# Patient Record
Sex: FEMALE | URBAN - METROPOLITAN AREA
[De-identification: names, ages, dates, MRNs, and addresses within clinical notes are randomized per-mention and may not be internally consistent; named-entity substitution may affect disease eponyms.]

---

## 2017-03-22 ASSESSMENT — ENCOUNTER SYMPTOMS
SPEECH CHANGE: 0
ALTERED TEMPERATURE REGULATION: 1
DECREASED APPETITE: 1
DEPRESSION: 1
HOARSE VOICE: 0
HEARTBURN: 1
JAUNDICE: 0
POOR WOUND HEALING: 1
WEAKNESS: 0
ARTHRALGIAS: 1
SEIZURES: 0
PARALYSIS: 0
EYE IRRITATION: 1
SMELL DISTURBANCE: 0
POLYPHAGIA: 0
ORTHOPNEA: 0
FEVER: 1
BLOOD IN STOOL: 0
HALLUCINATIONS: 0
NAIL CHANGES: 0
SINUS PAIN: 1
POLYDIPSIA: 1
JOINT SWELLING: 1
EXERCISE INTOLERANCE: 0
MYALGIAS: 1
CHILLS: 1
CLAUDICATION: 0
HYPERTENSION: 0
HOT FLASHES: 0
HYPOTENSION: 0
DOUBLE VISION: 0
DIFFICULTY URINATING: 0
SINUS CONGESTION: 1
NUMBNESS: 1
INCREASED ENERGY: 0
PANIC: 0
STIFFNESS: 1
LEG PAIN: 0
MUSCLE WEAKNESS: 1
NERVOUS/ANXIOUS: 1
SLEEP DISTURBANCES DUE TO BREATHING: 0
NECK PAIN: 1
WEIGHT LOSS: 1
BLOATING: 1
WEIGHT GAIN: 0
MUSCLE CRAMPS: 1
FATIGUE: 1
LEG SWELLING: 0
DECREASED CONCENTRATION: 1
TINGLING: 1
FLANK PAIN: 0
DISTURBANCES IN COORDINATION: 0
TASTE DISTURBANCE: 0
BRUISES/BLEEDS EASILY: 1
HEMATURIA: 0
RECTAL PAIN: 1
VOMITING: 0
INSOMNIA: 1
MEMORY LOSS: 1
DECREASED LIBIDO: 1
LOSS OF CONSCIOUSNESS: 0
ABDOMINAL PAIN: 1
TACHYCARDIA: 0
CONSTIPATION: 1
SORE THROAT: 0
LIGHT-HEADEDNESS: 1
PALPITATIONS: 0
EYE REDNESS: 1
DIZZINESS: 1
HEADACHES: 1
DYSURIA: 0
SYNCOPE: 0
SKIN CHANGES: 0
TREMORS: 0
DIARRHEA: 1
NAUSEA: 1
EYE WATERING: 0
TROUBLE SWALLOWING: 0
EYE PAIN: 0
NECK MASS: 0
BOWEL INCONTINENCE: 1
SWOLLEN GLANDS: 0
RECTAL BLEEDING: 0
BACK PAIN: 1

## 2017-03-23 ENCOUNTER — OFFICE VISIT (OUTPATIENT)
Dept: INFECTIOUS DISEASES | Facility: CLINIC | Age: 57
End: 2017-03-23
Payer: COMMERCIAL

## 2017-03-23 VITALS
HEART RATE: 67 BPM | SYSTOLIC BLOOD PRESSURE: 132 MMHG | BODY MASS INDEX: 33.2 KG/M2 | DIASTOLIC BLOOD PRESSURE: 81 MMHG | HEIGHT: 65 IN | TEMPERATURE: 98.5 F | WEIGHT: 199.3 LBS

## 2017-03-23 DIAGNOSIS — D80.9 IMMUNOGLOBULIN DEFICIENCY (H): Primary | ICD-10-CM

## 2017-03-23 DIAGNOSIS — D80.9 IMMUNOGLOBULIN DEFICIENCY (H): ICD-10-CM

## 2017-03-23 LAB
ALBUMIN SERPL-MCNC: 4 G/DL (ref 3.4–5)
ALP SERPL-CCNC: 81 U/L (ref 40–150)
ALT SERPL W P-5'-P-CCNC: 16 U/L (ref 0–50)
ANION GAP SERPL CALCULATED.3IONS-SCNC: 9 MMOL/L (ref 3–14)
AST SERPL W P-5'-P-CCNC: 17 U/L (ref 0–45)
BASOPHILS # BLD AUTO: 0 10E9/L (ref 0–0.2)
BASOPHILS NFR BLD AUTO: 0.5 %
BILIRUB SERPL-MCNC: 0.3 MG/DL (ref 0.2–1.3)
BUN SERPL-MCNC: 18 MG/DL (ref 7–30)
CALCIUM SERPL-MCNC: 8.8 MG/DL (ref 8.5–10.1)
CHLORIDE SERPL-SCNC: 107 MMOL/L (ref 94–109)
CO2 SERPL-SCNC: 26 MMOL/L (ref 20–32)
CREAT SERPL-MCNC: 1.02 MG/DL (ref 0.52–1.04)
DIFFERENTIAL METHOD BLD: ABNORMAL
EOSINOPHIL # BLD AUTO: 0.4 10E9/L (ref 0–0.7)
EOSINOPHIL NFR BLD AUTO: 6.2 %
ERYTHROCYTE [DISTWIDTH] IN BLOOD BY AUTOMATED COUNT: 17.8 % (ref 10–15)
GFR SERPL CREATININE-BSD FRML MDRD: 56 ML/MIN/1.7M2
GLUCOSE SERPL-MCNC: 84 MG/DL (ref 70–99)
HCT VFR BLD AUTO: 31.2 % (ref 35–47)
HGB BLD-MCNC: 8.9 G/DL (ref 11.7–15.7)
IMM GRANULOCYTES # BLD: 0 10E9/L (ref 0–0.4)
IMM GRANULOCYTES NFR BLD: 0.3 %
LYMPHOCYTES # BLD AUTO: 1.6 10E9/L (ref 0.8–5.3)
LYMPHOCYTES NFR BLD AUTO: 23.9 %
MCH RBC QN AUTO: 19.4 PG (ref 26.5–33)
MCHC RBC AUTO-ENTMCNC: 28.5 G/DL (ref 31.5–36.5)
MCV RBC AUTO: 68 FL (ref 78–100)
MONOCYTES # BLD AUTO: 0.5 10E9/L (ref 0–1.3)
MONOCYTES NFR BLD AUTO: 7.4 %
NEUTROPHILS # BLD AUTO: 4.1 10E9/L (ref 1.6–8.3)
NEUTROPHILS NFR BLD AUTO: 61.7 %
NRBC # BLD AUTO: 0 10*3/UL
NRBC BLD AUTO-RTO: 0 /100
PLATELET # BLD AUTO: 338 10E9/L (ref 150–450)
POTASSIUM SERPL-SCNC: 4.1 MMOL/L (ref 3.4–5.3)
PROT SERPL-MCNC: 6.9 G/DL (ref 6.8–8.8)
RBC # BLD AUTO: 4.58 10E12/L (ref 3.8–5.2)
SODIUM SERPL-SCNC: 142 MMOL/L (ref 133–144)
WBC # BLD AUTO: 6.6 10E9/L (ref 4–11)

## 2017-03-23 PROCEDURE — 82784 ASSAY IGA/IGD/IGG/IGM EACH: CPT | Performed by: INTERNAL MEDICINE

## 2017-03-23 PROCEDURE — 36415 COLL VENOUS BLD VENIPUNCTURE: CPT | Performed by: INTERNAL MEDICINE

## 2017-03-23 PROCEDURE — 86648 DIPHTHERIA ANTIBODY: CPT | Performed by: INTERNAL MEDICINE

## 2017-03-23 PROCEDURE — 86355 B CELLS TOTAL COUNT: CPT | Performed by: INTERNAL MEDICINE

## 2017-03-23 PROCEDURE — 86357 NK CELLS TOTAL COUNT: CPT | Performed by: INTERNAL MEDICINE

## 2017-03-23 PROCEDURE — 82785 ASSAY OF IGE: CPT | Performed by: INTERNAL MEDICINE

## 2017-03-23 PROCEDURE — 86360 T CELL ABSOLUTE COUNT/RATIO: CPT | Performed by: INTERNAL MEDICINE

## 2017-03-23 PROCEDURE — 86359 T CELLS TOTAL COUNT: CPT | Performed by: INTERNAL MEDICINE

## 2017-03-23 PROCEDURE — 85025 COMPLETE CBC W/AUTO DIFF WBC: CPT | Performed by: INTERNAL MEDICINE

## 2017-03-23 PROCEDURE — 86774 TETANUS ANTIBODY: CPT | Performed by: INTERNAL MEDICINE

## 2017-03-23 PROCEDURE — 80053 COMPREHEN METABOLIC PANEL: CPT | Performed by: INTERNAL MEDICINE

## 2017-03-23 PROCEDURE — 99213 OFFICE O/P EST LOW 20 MIN: CPT | Mod: ZF

## 2017-03-23 RX ORDER — ALBUTEROL SULFATE 0.83 MG/ML
1 SOLUTION RESPIRATORY (INHALATION) EVERY 6 HOURS PRN
COMMUNITY

## 2017-03-23 RX ORDER — CYANOCOBALAMIN 1000 UG/ML
1 INJECTION, SOLUTION INTRAMUSCULAR; SUBCUTANEOUS
COMMUNITY

## 2017-03-23 RX ORDER — FLUTICASONE PROPIONATE 50 MCG
2 SPRAY, SUSPENSION (ML) NASAL DAILY
COMMUNITY
End: 2017-05-04

## 2017-03-23 RX ORDER — EPINEPHRINE 0.3 MG/.3ML
0.3 INJECTION SUBCUTANEOUS PRN
COMMUNITY

## 2017-03-23 ASSESSMENT — PAIN SCALES - GENERAL: PAINLEVEL: SEVERE PAIN (6)

## 2017-03-23 NOTE — NURSING NOTE
"Chief Complaint   Patient presents with     New Patient     Low antibodies       Initial /81  Pulse 67  Temp 98.5  F (36.9  C) (Oral)  Ht 1.638 m (5' 4.5\")  Wt 90.4 kg (199 lb 4.8 oz)  BMI 33.68 kg/m2 Estimated body mass index is 33.68 kg/(m^2) as calculated from the following:    Height as of this encounter: 1.638 m (5' 4.5\").    Weight as of this encounter: 90.4 kg (199 lb 4.8 oz).  Medication Reconciliation: complete  "

## 2017-03-23 NOTE — PATIENT INSTRUCTIONS
Thank you for coming in today to talk about your immune system.    We will draw labs today as follows:    Orders Placed This Encounter   Procedures     CBC with platelets differential     Comprehensive metabolic panel     T cell subset extended profile     Immunoglobulins A G and M     IgG     Diphtheria tetanus antibody panel     Please come back in about 2 weeks to discuss labs and next steps.    We will try to get records from [facility] before your next visit.    Please feel free to contact me as below if you have any concerns in the meantime.      Dilan Fulton MD  Fellow, Infectious Disease    Direct line to call or text during business hours Mon-Fri 8am-5pm:    [phone]    Email anytime:   [email or web address]

## 2017-03-23 NOTE — PROGRESS NOTES
"  INFECTIOUS DISEASE CLINIC    REASON FOR VISIT:   New consultation for immunoglobulin deficiency     REFERRED BY:  Dr. Heath Baum MD of [facility]     HISTORY OF PRESENT ILLNESS:   Nery Chandler is a 55 y/o female patient with obesity and prior Nadya-en-Y gastric bypass surgery.  She presents to our clinic today for further evaluation of her low immunoglobulin levels, which were detected by Gastroenterology during their evaluation for diarrhea.    Nery describes struggling with diarrhea since at least .  Per my review of her office notes from Gastroenterology, they think this is likely to be due to constipation predominant irritable bowel syndrome with episodes of overflow.  The patient has daily bowel movements but with a feeling of incomplete evacuation, and then about once every 3 weeks will have 8-10 episodes of \"explosive watery diarrhea.\"  She also has increased gas and abdominal spasms.  This has improved somewhat with the use of dicyclomine and over the counter anti-gas preparations.  They have recently started a bowel regimen with Miralax.     Colonoscopy in  noted sigmoid diverticulosis, but otherwise unremarkable.   As part of the work up, they wanted to test for celiac disease but found the total IgA was low at 23.  They then checked the IgE/IgG/IgM and found these to be low as well at 2/398/24 respectively.    GI:  Nery notes a history of two GI infections in late  and early . She was diagnosed with both Aeromonas infection as well as C. Difficile colitis.  She and her gastroenterologist think her IBS developed after these infections.   She recently had a bout of abdominal pain and elevated temperatures that was treated as diverticulitis with a course of Augmentin.    Skin and soft tissue:  Nery reports wound dehiscence with infections after each of her C-sections when she was in her 20s.  She required a wound vac after her second .  She recalls several " "procedures to repair her incision, including hernia repair and a \"tummy tuck.\"  She doesn't think she healed well after her gastric bypass surgery either.  She also reports recurrent skin and soft tissue infections in her groin, with at least 4 abscesses requiring I+D in the past.     Sinopulmonary:  Nery tells me that \"any kind of cold turns into a bronchitis.\"  She has needed many courses of antibiotics in the past for both \"bronchitis\" and sinus infections. She has never been hospitalized for a respiratory infection. She may also have allergic rhinitis and reports that Benadryl has helped with her sinuses in the past.   She is also a smoker, since age 15.  She smoked up to 2ppd of tobacco cigarettes in her 20s, and is now down to a 1/2 ppd in the last few years.     PAST MEDICAL HISTORY:    No past medical history on file.  Depression  Hypothyroidism  Migraine headaches  Obesity  Heart murmur NOS    PAST SURGICAL HISTORY:  No past surgical history on file.  Nadya-en-Y gastric bypass 2004  C-sections x 2  Hysterectomy  Abdominal wall hernia repairs  Cholecystectomy    FAMILY PAST MEDICAL HISTORY:  No known family history of immunodeficiency     SOCIAL HISTORY:  As above, has smoked tobacco cigarettes since age 15  Presents in clinic with her , who is very supportive.  They live together and have adult children.    CURRENT MEDICATIONS:    Current Outpatient Prescriptions   Medication     LEVOTHYROXINE SODIUM PO     LISINOPRIL PO     QUEtiapine Fumarate (SEROQUEL PO)     BuPROPion HCl (WELLBUTRIN XL PO)     ONDANSETRON PO     TRAZODONE HCL PO     DICYCLOMINE HCL PO     SERTRALINE HCL PO     PANTOPRAZOLE SODIUM PO     IBUPROFEN PO     fluticasone (FLONASE) 50 MCG/ACT spray     aspirin-acetaminophen-caffeine (EXCEDRIN MIGRAINE) 250-250-65 MG per tablet     DIPHENHYDRAMINE HCL PO     cyanocobalamin (VITAMIN B12) 1000 MCG/ML injection     SUMATRIPTAN SUCCINATE PO     albuterol (2.5 MG/3ML) 0.083% neb solution     " "EPINEPHrine 0.3 MG/0.3ML injection     UNABLE TO FIND     No current facility-administered medications for this visit.      ALLERGIES:     Reports a history of anaphylaxis that required emergent intervention.  No etiology was identified, but patient now carries an epi-pen.     Allergies   Allergen Reactions     Chlorpheniramine Unknown     Dexatrim Unknown     Phenylpropanolamine Unknown     Celexa [Citalopram] Palpitations     Erythromycin Palpitations     Hydrocodone Palpitations     REVIEW OF SYSTEMS: A full 12-point review of systems was obtained, pertinent positives and negatives as above.   Patient also filled out an extensive clinic survey of HPI and ROS that is available in EPIC.  We reviewed these responses.  Important to note:  Patient sometimes has spontaneous cold chills without fevers.   (She did have some elevated temps with the recent diagnosis of diverticulitis, but the cold chills continued after temperature normalized)  She also reports some increased urinary urgency without dysuria or polyuria for the last 6 months.  She seems to be prone to vaginal yeast infections and has noted a lower labido with vaginal dryness.  She sees a chiropractor for various muscle aches.     PHYSICAL EXAMINATION:    VITAL SIGNS: /81  Pulse 67  Temp 98.5  F (36.9  C) (Oral)  Ht 1.638 m (5' 4.5\")  Wt 90.4 kg (199 lb 4.8 oz)  BMI 33.68 kg/m2    GENERAL:   No acute distress    HEENT: No icterus or injection. Oropharynx moist and clear without lesions or exudate.    NECK: Supple and nontender  LYMPH:  No cervical, axillary or inguinal lymphadenopathy  LUNGS: Clear to ausculation bilaterally without any increased work of breathing  HEART: Regular rate and rhythm and no murmur, gallop or rub    ABDOMEN: Normoactive bowel sounds, obese and soft, nontender, nondistended, no hepatosplenomegaly.    /GYN: deferred  EXTREMITIES: Warm and well perfused without clubbing, cyanosis, or edema  SKIN:  No rashes  NEURO:  " Awake, alert, no focal neurologic deficits.  PSYCH: Affect normal. Speech fluent and appropriate.     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  Her levels obtained from [facility] are quite low across the board.  First, we would like to see her albumin to make sure that this is not attributable to a protein losing enteropathy.  Otherwise, she does have a history of some GI, skin/soft tissue, and sinopulmonary infections.  Some of this may be explained by her obesity and smoking history, however it is significant enough to warrant further investigation.  There is potential for common variable immunodeficiency (CVID).    Today we will send for a CBC, CMP, and will repeat the immunoglobulins to see if there are has been an interval change.  We will also assess B and T cell subsets, along with some vaccine response with a Diphtheria/Tetanus antibody titer.   The patient does not recall when her last tetanus shot was, and there is no record in [facility].    We will have the patient return in 2 weeks to discuss results and next steps.      Patient was seen and discussed with the Infectious Disease attending and clinic preceptor, Dr. Azeb Brown.      Dilan Fulton MD    Fellow in Adult and Pediatric Infectious Disease    pager: [phone]    Attending Attestation:  I evaluated Ms Chandler with fellow Dr. Fulton.  I have reviewed today's labs, vitals and imaging results. This note reflects our joint findings, assessment and recommendations.    Azeb Brown MD  [phone]

## 2017-03-23 NOTE — MR AVS SNAPSHOT
After Visit Summary   3/23/2017    Nery Chandler    MRN: [id number]           Patient Information     Date Of Birth          1960        Visit Information        Provider Department      3/23/2017 2:30 PM Dilan Fulton MD [facility] and Infectious Diseases        Today's Diagnoses     Immunoglobulin deficiency (H)    -  1      Care Instructions        Thank you for coming in today to talk about your immune system.    We will draw labs today as follows:    Orders Placed This Encounter   Procedures     CBC with platelets differential     Comprehensive metabolic panel     T cell subset extended profile     Immunoglobulins A G and M     IgG     Diphtheria tetanus antibody panel     Please come back in about 2 weeks to discuss labs and next steps.    We will try to get records from [facility] before your next visit.    Please feel free to contact me as below if you have any concerns in the meantime.      Dilan Fulton MD  Fellow, Infectious Disease    Direct line to call or text during business hours Mon-Fri 8am-5pm:    [phone]    Email anytime:   [email or web address]        Follow-ups after your visit        Your next 10 appointments already scheduled     Mar 23, 2017  4:15 PM CDT   Lab with  LAB   [facility] ([facility])    [address] [place] [address]  [address] [facility] [id number] [phone]            Apr 06, 2017  3:30 PM CDT   (Arrive by 3:15 PM)   Return Visit with Dilan Fulton MD   [facility] and Infectious Diseases ([facility])    [address] [place] [address]  3rd Floor  [facility] [id number] [phone]              Future tests that were ordered for you today     Open Future Orders        Priority Expected Expires Ordered    CBC with platelets differential Routine 3/23/2017 4/23/2017 3/23/2017    Comprehensive metabolic panel Routine 3/23/2017 4/23/2017 3/23/2017    T cell  "subset extended profile Routine 3/23/2017 4/23/2017 3/23/2017    Immunoglobulins A G and M Routine 3/23/2017 4/23/2017 3/23/2017    IgG Routine 3/23/2017 4/23/2017 3/23/2017    Diphtheria tetanus antibody panel Routine 3/23/2017 4/23/2017 3/23/2017            Who to contact     If you have questions or need follow up information about today's clinic visit or your schedule please contact [facility] INFECTIOUS DISEASES directly at [phone].  Normal or non-critical lab and imaging results will be communicated to you by [employer], letter or phone within 4 business days after the clinic has received the results. If you do not hear from us within 7 days, please contact the clinic through [employer] or phone. If you have a critical or abnormal lab result, we will notify you by phone as soon as possible.  Submit refill requests through [employer] or call your pharmacy and they will forward the refill request to us. Please allow 3 business days for your refill to be completed.          Additional Information About Your Visit        [employer] Information     [employer] gives you secure access to your electronic health record. If you see a primary care provider, you can also send messages to your care team and make appointments. If you have questions, please call your primary care clinic.  If you do not have a primary care provider, please call [phone] and they will assist you.        Care EveryWhere ID     This is your Care EveryWhere ID. This could be used by other organizations to access your [facility] medical records  [id number]        Your Vitals Were     Pulse Temperature Height BMI (Body Mass Index)          67 98.5  F (36.9  C) (Oral) 1.638 m (5' 4.5\") 33.68 kg/m2         Blood Pressure from Last 3 Encounters:   03/23/17 132/81    Weight from Last 3 Encounters:   03/23/17 90.4 kg (199 lb 4.8 oz)               Primary Care Provider Office Phone # Fax #    Jose ALVINA Astorga [phone]       " [facility] [address] [facility] [address]        Thank you!     Thank you for choosing [facility] INFECTIOUS DISEASES  for your care. Our goal is always to provide you with excellent care. Hearing back from our patients is one way we can continue to improve our services. Please take a few minutes to complete the written survey that you may receive in the mail after your visit with us. Thank you!             Your Updated Medication List - Protect others around you: Learn how to safely use, store and throw away your medicines at www.disposemymeds.org.          This list is accurate as of: 3/23/17  4:12 PM.  Always use your most recent med list.                   Brand Name Dispense Instructions for use    albuterol (2.5 MG/3ML) 0.083% neb solution      Take 1 vial by nebulization every 6 hours as needed for shortness of breath / dyspnea or wheezing       aspirin-acetaminophen-caffeine 250-250-65 MG per tablet    EXCEDRIN MIGRAINE     Take 2 tablets by mouth every 6 hours as needed for headaches       cyanocobalamin 1000 MCG/ML injection    VITAMIN B12     Inject 1 mL into the muscle every 30 days       DICYCLOMINE HCL PO      Take 20 mg by mouth 4 times daily       DIPHENHYDRAMINE HCL PO      Take 25 mg by mouth every 4 hours as needed       EPINEPHrine 0.3 MG/0.3ML injection      Inject 0.3 mg into the muscle as needed for anaphylaxis       fluticasone 50 MCG/ACT spray    FLONASE     Spray 2 sprays into both nostrils daily       IBUPROFEN PO      Take 200 mg by mouth 4 times daily       LEVOTHYROXINE SODIUM PO      Take 112 mcg by mouth daily       LISINOPRIL PO      Take 10 mg by mouth daily       ONDANSETRON PO      Take 4 mg by mouth every 8 hours as needed for nausea       PANTOPRAZOLE SODIUM PO      Take 40 mg by mouth daily       SEROQUEL PO      Take 50 mg by mouth At Bedtime       SERTRALINE HCL PO      Take 100 mg by mouth 2 times daily       SUMATRIPTAN SUCCINATE PO       Take 100 mg by mouth once       TIZANIDINE HCL PO      Take 2 mg by mouth 2 times daily       TRAZODONE HCL PO      Take 100 mg by mouth At Bedtime       UNABLE TO FIND      Take 2 tablets by mouth daily MEDICATION NAME: IBgard for IBS       WELLBUTRIN XL PO      Take 300 mg by mouth daily

## 2017-03-24 LAB
CD19 CELLS # BLD: 264 CELLS/UL (ref 107–698)
CD19 CELLS NFR BLD: 18 % (ref 6–27)
CD3 CELLS # BLD: 1040 CELLS/UL (ref 603–2990)
CD3 CELLS NFR BLD: 69 % (ref 49–84)
CD3+CD4+ CELLS # BLD: 709 CELLS/UL (ref 441–2156)
CD3+CD4+ CELLS NFR BLD: 47 % (ref 28–63)
CD3+CD4+ CELLS/CD3+CD8+ CLL BLD: 2.47 % (ref 1.4–2.6)
CD3+CD8+ CELLS # BLD: 282 CELLS/UL (ref 125–1312)
CD3+CD8+ CELLS NFR BLD: 19 % (ref 10–40)
CD3-CD16+CD56+ CELLS # BLD: 173 CELLS/UL (ref 95–640)
CD3-CD16+CD56+ CELLS NFR BLD: 12 % (ref 4–25)
IFC SPECIMEN: NORMAL
IGA SERPL-MCNC: 32 MG/DL (ref 70–380)
IGG SERPL-MCNC: 441 MG/DL (ref 695–1620)
IGM SERPL-MCNC: 34 MG/DL (ref 60–265)
IMMUNODEFICIENCY MARKERS SPEC-IMP: NORMAL

## 2017-03-26 LAB — IGE SERPL-ACNC: 4 KIU/L (ref 0–114)

## 2017-03-29 LAB
C DIPHTHERIAE IGG SER IA-ACNC: 0.41 IU/ML
C TETANI IGG SER IA-ACNC: 4.52 IU/ML

## 2017-04-02 ASSESSMENT — ENCOUNTER SYMPTOMS
NAUSEA: 1
HEARTBURN: 0
SEIZURES: 0
ALTERED TEMPERATURE REGULATION: 1
TINGLING: 1
HOT FLASHES: 0
MEMORY LOSS: 0
DECREASED CONCENTRATION: 1
MUSCLE WEAKNESS: 1
WEAKNESS: 1
SINUS PAIN: 1
POOR WOUND HEALING: 0
TACHYCARDIA: 0
JOINT SWELLING: 1
LIGHT-HEADEDNESS: 0
BLOATING: 1
BACK PAIN: 1
STIFFNESS: 1
SINUS CONGESTION: 1
RECTAL BLEEDING: 0
DIFFICULTY URINATING: 0
HYPERTENSION: 0
SLEEP DISTURBANCES DUE TO BREATHING: 0
DEPRESSION: 1
SMELL DISTURBANCE: 0
FATIGUE: 1
INSOMNIA: 1
MYALGIAS: 1
CHILLS: 1
PANIC: 0
TASTE DISTURBANCE: 0
DISTURBANCES IN COORDINATION: 0
INCREASED ENERGY: 1
PALPITATIONS: 0
POLYPHAGIA: 0
LEG PAIN: 0
TREMORS: 0
CLAUDICATION: 0
FEVER: 0
BLOOD IN STOOL: 0
DECREASED APPETITE: 1
ORTHOPNEA: 0
DIZZINESS: 1
NUMBNESS: 1
WEIGHT GAIN: 0
NERVOUS/ANXIOUS: 1
LOSS OF CONSCIOUSNESS: 0
DIARRHEA: 1
EXERCISE INTOLERANCE: 0
VOMITING: 0
NECK PAIN: 1
SORE THROAT: 0
NIGHT SWEATS: 0
HALLUCINATIONS: 0
HEMATURIA: 0
HYPOTENSION: 0
BRUISES/BLEEDS EASILY: 0
SWOLLEN GLANDS: 1
CONSTIPATION: 1
SYNCOPE: 0
POLYDIPSIA: 1
ARTHRALGIAS: 1
ABDOMINAL PAIN: 1
HOARSE VOICE: 0
NECK MASS: 0
HEADACHES: 1
LEG SWELLING: 0
PARALYSIS: 0
DECREASED LIBIDO: 1
DYSURIA: 0
FLANK PAIN: 0
NAIL CHANGES: 0
JAUNDICE: 0
MUSCLE CRAMPS: 1
BOWEL INCONTINENCE: 0
TROUBLE SWALLOWING: 0
SKIN CHANGES: 0
SPEECH CHANGE: 0
WEIGHT LOSS: 1
RECTAL PAIN: 0

## 2017-04-06 ENCOUNTER — OFFICE VISIT (OUTPATIENT)
Dept: INFECTIOUS DISEASES | Facility: CLINIC | Age: 57
End: 2017-04-06
Attending: COLON & RECTAL SURGERY
Payer: COMMERCIAL

## 2017-04-06 VITALS
SYSTOLIC BLOOD PRESSURE: 116 MMHG | BODY MASS INDEX: 33.2 KG/M2 | DIASTOLIC BLOOD PRESSURE: 73 MMHG | TEMPERATURE: 97.9 F | HEART RATE: 77 BPM | WEIGHT: 199.3 LBS | HEIGHT: 65 IN

## 2017-04-06 DIAGNOSIS — D80.9 IMMUNOGLOBULIN DEFICIENCY (H): ICD-10-CM

## 2017-04-06 DIAGNOSIS — D80.9 IMMUNOGLOBULIN DEFICIENCY (H): Primary | ICD-10-CM

## 2017-04-06 PROCEDURE — 99213 OFFICE O/P EST LOW 20 MIN: CPT | Mod: ZF

## 2017-04-06 PROCEDURE — 86317 IMMUNOASSAY INFECTIOUS AGENT: CPT | Mod: 91 | Performed by: INTERNAL MEDICINE

## 2017-04-06 PROCEDURE — 36415 COLL VENOUS BLD VENIPUNCTURE: CPT | Performed by: INTERNAL MEDICINE

## 2017-04-06 RX ORDER — METHYLPREDNISOLONE 4 MG
TABLET, DOSE PACK ORAL
COMMUNITY
Start: 2017-04-05 | End: 2017-05-04

## 2017-04-06 ASSESSMENT — PAIN SCALES - GENERAL: PAINLEVEL: NO PAIN (0)

## 2017-04-06 NOTE — NURSING NOTE
"Chief Complaint   Patient presents with     RECHECK     follow up with immunoglobulins, tzimmer cma       Initial /73  Pulse 77  Temp 97.9  F (36.6  C) (Oral)  Ht 1.638 m (5' 4.5\")  Wt 90.4 kg (199 lb 4.8 oz)  BMI 33.68 kg/m2 Estimated body mass index is 33.68 kg/(m^2) as calculated from the following:    Height as of this encounter: 1.638 m (5' 4.5\").    Weight as of this encounter: 90.4 kg (199 lb 4.8 oz).  Medication Reconciliation: complete    "

## 2017-04-06 NOTE — PROGRESS NOTES
"    INFECTIOUS DISEASE CLINIC    REASON FOR VISIT:   Follow-up immunoglobulin deficiency     HISTORY OF PRESENT ILLNESS:   Nery Chandler is a 57 y/o female patient with obesity, depression, prior Nadya-en-Y gastric bypass surgery and other abdominal surgeries, and a diagnosis per GI of constipation predominant irritable bowel syndrome with episodes of overflow. She presents to our clinic today for follow-up of low immunoglobulin levels that were identified by GI during their work-up.  Her history of prior infections revealed some GI, skin/soft tissue, and sinopulmonary infections.    For our initial visit 3/23/17, we ordered CBC, CMP, repeat immunoglobulins, B and T cell subsets, and a Diphtheria/Tetanus antibody titer.  This revealed anemia, normal CMP including normal albumin of 4.0, low immunoglobulins consistent with prior assessments, normal B and T cell subsets, and protective Diphtheria/Tetanus antibody titers.    Nery reports improvements in her GI symptoms with her new bowel regimen designed to relieve constipation.  She has her next GI appointment 5/9/17.    Moreover, eNry has been suffering from pain related to a \"pinched nerve\" and she reports starting a Medrol dose pack to treat these symptoms.    No new infections and no fevers or chills noted since our last visit.         PAST MEDICAL HISTORY:    Depression  Hypothyroidism  Migraine headaches  Obesity  Heart murmur NOS    PAST SURGICAL HISTORY:  Nadya-en-Y gastric bypass 2004  C-sections x 2  Hysterectomy  Abdominal wall hernia repairs  Cholecystectomy    FAMILY PAST MEDICAL HISTORY:  No known family history of immunodeficiency     SOCIAL HISTORY:  Has smoked tobacco cigarettes since age 15  Presents in clinic with her , who is very supportive.  They live together and have adult children.    CURRENT MEDICATIONS:    Current Outpatient Prescriptions   Medication     LEVOTHYROXINE SODIUM PO     LISINOPRIL PO     QUEtiapine Fumarate (SEROQUEL PO) " "    BuPROPion HCl (WELLBUTRIN XL PO)     ONDANSETRON PO     TRAZODONE HCL PO     DICYCLOMINE HCL PO     SERTRALINE HCL PO     PANTOPRAZOLE SODIUM PO     IBUPROFEN PO     fluticasone (FLONASE) 50 MCG/ACT spray     aspirin-acetaminophen-caffeine (EXCEDRIN MIGRAINE) 250-250-65 MG per tablet     DIPHENHYDRAMINE HCL PO     cyanocobalamin (VITAMIN B12) 1000 MCG/ML injection     SUMATRIPTAN SUCCINATE PO     albuterol (2.5 MG/3ML) 0.083% neb solution     EPINEPHrine 0.3 MG/0.3ML injection     UNABLE TO FIND     No current facility-administered medications for this visit.      ALLERGIES:     Reports a history of anaphylaxis that required emergent intervention.  No etiology was identified, but patient now carries an epi-pen.     Allergies   Allergen Reactions     Chlorpheniramine Unknown     Dexatrim Unknown     Phenylpropanolamine Unknown     Celexa [Citalopram] Palpitations     Erythromycin Palpitations     Hydrocodone Palpitations     REVIEW OF SYSTEMS: A full 12-point review of systems was obtained, pertinent positives and negatives as above.   Patient also filled out an extensive clinic survey of HPI and ROS that is available in EPIC.  We reviewed these responses, and did not find any new concerns.    PHYSICAL EXAMINATION:    VITAL SIGNS: /73  Pulse 77  Temp 97.9  F (36.6  C) (Oral)  Ht 1.638 m (5' 4.5\")  Wt 90.4 kg (199 lb 4.8 oz)  BMI 33.68 kg/m2    GENERAL:   No acute distress    HEENT: No icterus or injection. Oropharynx moist and clear without lesions or exudate.    NECK: Supple but tender and with muscular spasm noted  LYMPH:  No cervical, axillary or inguinal lymphadenopathy  LUNGS: Clear to ausculation bilaterally without any increased work of breathing  HEART: Regular rate and rhythm and no murmur, gallop or rub    ABDOMEN: Normoactive bowel sounds, obese and soft, nontender, nondistended, no hepatosplenomegaly.    /GYN: deferred  EXTREMITIES: Warm and well perfused without clubbing, cyanosis, or " edema  SKIN:  No rashes  NEURO:  Awake, alert, no focal neurologic deficits.  PSYCH: Affect normal. Speech fluent and appropriate.     LABORATORY DATA:    Reviewed labs from [facility]:  1/9/17:   Fecal fat - normal,   Fecal pancreatic elastase >500, Cryptosporidium Ag and Giardia Ag negative, serum IgA 23, tissue Transglutaminase IgA <2    2/8/17:   IgE 2,  IgA 24,  IgG 398,  IgM 24    Labs here at [facility]:  3/23/2017  Orders Only on 03/23/2017   Component Date Value Ref Range Status     WBC 03/23/2017 6.6  4.0 - 11.0 10e9/L Final     RBC Count 03/23/2017 4.58  3.8 - 5.2 10e12/L Final     Hemoglobin 03/23/2017 8.9* 11.7 - 15.7 g/dL Final     Hematocrit 03/23/2017 31.2* 35.0 - 47.0 % Final     MCV 03/23/2017 68* 78 - 100 fl Final     MCH 03/23/2017 19.4* 26.5 - 33.0 pg Final     MCHC 03/23/2017 28.5* 31.5 - 36.5 g/dL Final     RDW 03/23/2017 17.8* 10.0 - 15.0 % Final     Platelet Count 03/23/2017 338  150 - 450 10e9/L Final     Diff Method 03/23/2017 Auto  Final     % Neutrophils 03/23/2017 61.7  % Final     % Lymphocytes 03/23/2017 23.9  % Final     % Monocytes 03/23/2017 7.4  % Final     % Eosinophils 03/23/2017 6.2  % Final     % Basophils 03/23/2017 0.5  % Final     % Immature Granulocytes 03/23/2017 0.3  % Final     Nucleated RBCs 03/23/2017 0  0 /100 Final     Absolute Neutrophil 03/23/2017 4.1  1.6 - 8.3 10e9/L Final     Absolute Lymphocytes 03/23/2017 1.6  0.8 - 5.3 10e9/L Final     Absolute Monocytes 03/23/2017 0.5  0.0 - 1.3 10e9/L Final     Absolute Eosinophils 03/23/2017 0.4  0.0 - 0.7 10e9/L Final     Absolute Basophils 03/23/2017 0.0  0.0 - 0.2 10e9/L Final     Abs Immature Granulocytes 03/23/2017 0.0  0 - 0.4 10e9/L Final     Absolute Nucleated RBC 03/23/2017 0.0   Final     Sodium 03/23/2017 142  133 - 144 mmol/L Final     Potassium 03/23/2017 4.1  3.4 - 5.3 mmol/L Final     Chloride 03/23/2017 107  94 - 109 mmol/L Final     Carbon Dioxide 03/23/2017 26  20 - 32 mmol/L Final      Anion Gap 03/23/2017 9  3 - 14 mmol/L Final     Glucose 03/23/2017 84  70 - 99 mg/dL Final     Urea Nitrogen 03/23/2017 18  7 - 30 mg/dL Final     Creatinine 03/23/2017 1.02  0.52 - 1.04 mg/dL Final     GFR Estimate 03/23/2017 56* >60 mL/min/1.7m2 Final    Non  GFR Calc     GFR Estimate If Black 03/23/2017 68  >60 mL/min/1.7m2 Final    African American GFR Calc     Calcium 03/23/2017 8.8  8.5 - 10.1 mg/dL Final     Bilirubin Total 03/23/2017 0.3  0.2 - 1.3 mg/dL Final     Albumin 03/23/2017 4.0  3.4 - 5.0 g/dL Final     Protein Total 03/23/2017 6.9  6.8 - 8.8 g/dL Final     Alkaline Phosphatase 03/23/2017 81  40 - 150 U/L Final     ALT 03/23/2017 16  0 - 50 U/L Final     AST 03/23/2017 17  0 - 45 U/L Final     IFC Specimen 03/23/2017 Blood   Final     CD3 Mature T 03/23/2017 69  49 - 84 % Final     CD4 [place] T 03/23/2017 47  28 - 63 % Final     CD8 Suppressor T 03/23/2017 19  10 - 40 % Final     CD19 B Cells 03/23/2017 18  6 - 27 % Final     CD16 + 56 Natural Killer Cells 03/23/2017 12  4 - 25 % Final     CD4:CD8 Ratio 03/23/2017 2.47  1.40 - 2.60 Final     Absolute CD3 03/23/2017 1040  603 - 2990 cells/uL Final     Absolute CD4 03/23/2017 709  441 - 2156 cells/uL Final     Absolute CD8 03/23/2017 282  125 - 1312 cells/uL Final     Absolute CD19 03/23/2017 264  107 - 698 cells/uL Final     Absolute CD16+56 03/23/2017 173  95 - 640 cells/uL Final     T Cell Subset Profile Interpretati* 03/23/2017 << Do Not Report >>   Final     IGG 03/23/2017 441* 695 - 1620 mg/dL Final     IGA 03/23/2017 32* 70 - 380 mg/dL Final     IGM 03/23/2017 34* 60 - 265 mg/dL Final     Diphtheria Antibody 03/23/2017 0.41  IU/mL Final    Comment: A concentration >0.01 IU/mL is considered to be protective but a concentration  of >0.1 IU/mL is desirable.     Tetanus Antibody 03/23/2017 4.52  IU/mL Final    Comment: A concentration >0.01 IU/mL is considered to be protective but a concentration of >0.15 IU/mL is desirable.     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  Our recent labs reveal normal B and T cell subsets, and a normal antibody response to Diphtheria/Tetanus and sustained low immunoglobulin levels across the board.  She has anemia, and a normal albumin.      We remained concerned given her immunoglobulins are quite low, and she does have a history of some GI, skin/soft tissue, and sinopulmonary infections.  Some of this may be explained by her obesity and smoking history, however it is significant enough to warrant further investigation.  There is potential for common variable immunodeficiency (CVID).    The patient describes getting a \"pneumonia shot\" at age 28.  She recalls that this was because she had frequent bronchitis, and was starting work at a .  The immunization was \"recommended but not required,\" and she remembers deciding to take the shot.   Since she may show us protective antibody levels, we will start by just measuring Strep pneumoniae antibody titers, and depending on result with consider administering the vaccines for a pre and post assessment.    We will have the patient return in a month to discuss results and next steps.  We may have her come in for a nurse visit in the interim for vaccine.      Patient was seen and discussed with the Infectious Disease attending and clinic preceptor, Dr. Azeb Brown.      Dilan Fulton MD    Fellow in Adult and Pediatric Infectious Disease    pager: [phone]    Attending Attestation:  I evaluated Ms. Chandler with fellow Dr. Fulton.  I have reviewed today's labs and vitals as well as outside records. This note reflects our joint findings, assessment and recommendations.    Azeb Brown MD  [phone]      "

## 2017-04-06 NOTE — MR AVS SNAPSHOT
After Visit Summary   4/6/2017    Nery Chandler    MRN: [id number]           Patient Information     Date Of Birth          1960        Visit Information        Provider Department      4/6/2017 3:30 PM Dilan Fulton MD [facility] and Infectious Diseases        Today's Diagnoses     Immunoglobulin deficiency (H)    -  1       Follow-ups after your visit        Your next 10 appointments already scheduled     Jun 29, 2017  1:00 PM CDT   Nurse Visit with  Dsc Nurse   [facility] Infectious Diseases ([facility])    [address]  3rd Floor  [place] [facility] [address] [phone]              Who to contact     If you have questions or need follow up information about today's clinic visit or your schedule please contact [facility] AND INFECTIOUS DISEASES directly at [phone].  Normal or non-critical lab and imaging results will be communicated to you by [employer], letter or phone within 4 business days after the clinic has received the results. If you do not hear from us within 7 days, please contact the clinic through [employer] or phone. If you have a critical or abnormal lab result, we will notify you by phone as soon as possible.  Submit refill requests through [employer] or call your pharmacy and they will forward the refill request to us. Please allow 3 business days for your refill to be completed.          Additional Information About Your Visit        MyChart Information     [employer] gives you secure access to your electronic health record. If you see a primary care provider, you can also send messages to your care team and make appointments. If you have questions, please call your primary care clinic.  If you do not have a primary care provider, please call [phone] and they will assist you.        Care EveryWhere ID     This is your Care EveryWhere ID. This could be used by other organizations to access  "your [place] medical records  [id number]        Your Vitals Were     Pulse Temperature Height BMI (Body Mass Index)          77 97.9  F (36.6  C) (Oral) 1.638 m (5' 4.5\") 33.68 kg/m2         Blood Pressure from Last 3 Encounters:   05/04/17 108/67   04/06/17 116/73   03/23/17 132/81    Weight from Last 3 Encounters:   05/04/17 88.7 kg (195 lb 9.6 oz)   04/06/17 90.4 kg (199 lb 4.8 oz)   03/23/17 90.4 kg (199 lb 4.8 oz)                 Today's Medication Changes          These changes are accurate as of: 4/6/17 11:59 PM.  If you have any questions, ask your nurse or doctor.               Stop taking these medicines if you haven't already. Please contact your care team if you have questions.     TIZANIDINE HCL PO   Stopped by:  Dilan Fulton MD                    Primary Care Provider Office Phone # Fax #    Jose TINSLEY Rizwan [phone]       [facility] [address] [facility] [address]        Thank you!     Thank you for choosing [facility] AND INFECTIOUS DISEASES  for your care. Our goal is always to provide you with excellent care. Hearing back from our patients is one way we can continue to improve our services. Please take a few minutes to complete the written survey that you may receive in the mail after your visit with us. Thank you!             Your Updated Medication List - Protect others around you: Learn how to safely use, store and throw away your medicines at www.disposemymeds.org.          This list is accurate as of: 4/6/17 11:59 PM.  Always use your most recent med list.                   Brand Name Dispense Instructions for use    albuterol (2.5 MG/3ML) 0.083% neb solution      Take 1 vial by nebulization every 6 hours as needed for shortness of breath / dyspnea or wheezing       aspirin-acetaminophen-caffeine 250-250-65 MG per tablet    EXCEDRIN MIGRAINE     Take 2 tablets by mouth every 6 hours as needed for headaches       cyanocobalamin 1000 MCG/ML " injection    VITAMIN B12     Inject 1 mL into the muscle every 30 days Reported on 5/4/2017       DICYCLOMINE HCL PO      Take 20 mg by mouth 4 times daily       DIPHENHYDRAMINE HCL PO      Take 25 mg by mouth every 4 hours as needed       EPINEPHrine 0.3 MG/0.3ML injection      Inject 0.3 mg into the muscle as needed for anaphylaxis       IBUPROFEN PO      Take 200 mg by mouth 4 times daily       LEVOTHYROXINE SODIUM PO      Take 112 mcg by mouth daily       LISINOPRIL PO      Take 10 mg by mouth daily       ONDANSETRON PO      Take 4 mg by mouth every 8 hours as needed for nausea       PANTOPRAZOLE SODIUM PO      Take 40 mg by mouth daily       SEROQUEL PO      Take 50 mg by mouth At Bedtime       SERTRALINE HCL PO      Take 100 mg by mouth 2 times daily       SUMATRIPTAN SUCCINATE PO      Take 100 mg by mouth once       TRAZODONE HCL PO      Take 100 mg by mouth At Bedtime       UNABLE TO FIND      Take 2 tablets by mouth daily MEDICATION NAME: IBgard for IBS       WELLBUTRIN XL PO      Take 300 mg by mouth daily

## 2017-04-06 NOTE — LETTER
"4/6/2017       RE: Nery Chandler  [address] [place] [id number]     Dear Colleague,    Thank you for referring your patient, Nery Chandler, to the [facility] AND INFECTIOUS DISEASES at [facility]. Please see a copy of my visit note below.        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 She presents to our clinic today for follow-up of low immunoglobulin levels that were identified by GI during their work-up.  Her history of prior infections revealed some GI, skin/soft tissue, and sinopulmonary infections.    For our initial visit 3/23/17, we ordered CBC, CMP, repeat immunoglobulins, B and T cell subsets, and a Diphtheria/Tetanus antibody titer.  This revealed anemia, normal CMP including normal albumin of 4.0, low immunoglobulins consistent with prior assessments, normal B and T cell subsets, and protective Diphtheria/Tetanus antibody titers.    Nery reports improvements in her GI symptoms with her new bowel regimen designed to relieve constipation.  She has her next GI appointment 5/9/17.    Moreover, Nery has been suffering from pain related to a \"pinched nerve\" and she reports starting a Medrol dose pack to treat these symptoms.    No new infections and no fevers or chills noted since our last visit.         PAST MEDICAL HISTORY:    Depression  Hypothyroidism  Migraine headaches  Obesity  Heart murmur NOS    PAST SURGICAL HISTORY:  Nadya-en-Y gastric bypass 2004  C-sections x 2  Hysterectomy  Abdominal wall hernia repairs  Cholecystectomy    FAMILY PAST MEDICAL HISTORY:  No known family history of immunodeficiency     SOCIAL " "HISTORY:  Has smoked tobacco cigarettes since age 15  Presents in clinic with her , who is very supportive.  They live together and have adult children.    CURRENT MEDICATIONS:    Current Outpatient Prescriptions   Medication     LEVOTHYROXINE SODIUM PO     LISINOPRIL PO     QUEtiapine Fumarate (SEROQUEL PO)     BuPROPion HCl (WELLBUTRIN XL PO)     ONDANSETRON PO     TRAZODONE HCL PO     DICYCLOMINE HCL PO     SERTRALINE HCL PO     PANTOPRAZOLE SODIUM PO     IBUPROFEN PO     fluticasone (FLONASE) 50 MCG/ACT spray     aspirin-acetaminophen-caffeine (EXCEDRIN MIGRAINE) 250-250-65 MG per tablet     DIPHENHYDRAMINE HCL PO     cyanocobalamin (VITAMIN B12) 1000 MCG/ML injection     SUMATRIPTAN SUCCINATE PO     albuterol (2.5 MG/3ML) 0.083% neb solution     EPINEPHrine 0.3 MG/0.3ML injection     UNABLE TO FIND     No current facility-administered medications for this visit.      ALLERGIES:     Reports a history of anaphylaxis that required emergent intervention.  No etiology was identified, but patient now carries an epi-pen.     Allergies   Allergen Reactions     Chlorpheniramine Unknown     Dexatrim Unknown     Phenylpropanolamine Unknown     Celexa [Citalopram] Palpitations     Erythromycin Palpitations     Hydrocodone Palpitations     REVIEW OF SYSTEMS: A full 12-point review of systems was obtained, pertinent positives and negatives as above.   Patient also filled out an extensive clinic survey of HPI and ROS that is available in EPIC.  We reviewed these responses, and did not find any new concerns.    PHYSICAL EXAMINATION:    VITAL SIGNS: /73  Pulse 77  Temp 97.9  F (36.6  C) (Oral)  Ht 1.638 m (5' 4.5\")  Wt 90.4 kg (199 lb 4.8 oz)  BMI 33.68 kg/m2    GENERAL:   No acute distress    HEENT: No icterus or injection. Oropharynx moist and clear without lesions or exudate.    NECK: Supple but tender and with muscular spasm noted  LYMPH:  No cervical, axillary or inguinal lymphadenopathy  LUNGS: Clear to " ausculation bilaterally without any increased work of breathing  HEART: Regular rate and rhythm and no murmur, gallop or rub    ABDOMEN: Normoactive bowel sounds, obese and soft, nontender, nondistended, no hepatosplenomegaly.    /GYN: deferred  EXTREMITIES: Warm and well perfused without clubbing, cyanosis, or edema  SKIN:  No rashes  NEURO:  Awake, alert, no focal neurologic deficits.  PSYCH: Affect normal. Speech fluent and appropriate.     LABORATORY DATA:    Reviewed labs from [facility]:  1/9/17:   Fecal fat - normal,   Fecal pancreatic elastase >500, Cryptosporidium Ag and Giardia Ag negative, serum IgA 23, tissue Transglutaminase IgA <2    2/8/17:   IgE 2,  IgA 24,  IgG 398,  IgM 24    Labs here at [facility]:  3/23/2017  Orders Only on 03/23/2017   Component Date Value Ref Range Status     WBC 03/23/2017 6.6  4.0 - 11.0 10e9/L Final     RBC Count 03/23/2017 4.58  3.8 - 5.2 10e12/L Final     Hemoglobin 03/23/2017 8.9* 11.7 - 15.7 g/dL Final     Hematocrit 03/23/2017 31.2* 35.0 - 47.0 % Final     MCV 03/23/2017 68* 78 - 100 fl Final     MCH 03/23/2017 19.4* 26.5 - 33.0 pg Final     MCHC 03/23/2017 28.5* 31.5 - 36.5 g/dL Final     RDW 03/23/2017 17.8* 10.0 - 15.0 % Final     Platelet Count 03/23/2017 338  150 - 450 10e9/L Final     Diff Method 03/23/2017 Auto  Final     % Neutrophils 03/23/2017 61.7  % Final     % Lymphocytes 03/23/2017 23.9  % Final     % Monocytes 03/23/2017 7.4  % Final     % Eosinophils 03/23/2017 6.2  % Final     % Basophils 03/23/2017 0.5  % Final     % Immature Granulocytes 03/23/2017 0.3  % Final     Nucleated RBCs 03/23/2017 0  0 /100 Final     Absolute Neutrophil 03/23/2017 4.1  1.6 - 8.3 10e9/L Final     Absolute Lymphocytes 03/23/2017 1.6  0.8 - 5.3 10e9/L Final     Absolute Monocytes 03/23/2017 0.5  0.0 - 1.3 10e9/L Final     Absolute Eosinophils 03/23/2017 0.4  0.0 - 0.7 10e9/L Final     Absolute Basophils 03/23/2017 0.0  0.0 - 0.2 10e9/L Final     Abs  Immature Granulocytes 03/23/2017 0.0  0 - 0.4 10e9/L Final     Absolute Nucleated RBC 03/23/2017 0.0   Final     Sodium 03/23/2017 142  133 - 144 mmol/L Final     Potassium 03/23/2017 4.1  3.4 - 5.3 mmol/L Final     Chloride 03/23/2017 107  94 - 109 mmol/L Final     Carbon Dioxide 03/23/2017 26  20 - 32 mmol/L Final     Anion Gap 03/23/2017 9  3 - 14 mmol/L Final     Glucose 03/23/2017 84  70 - 99 mg/dL Final     Urea Nitrogen 03/23/2017 18  7 - 30 mg/dL Final     Creatinine 03/23/2017 1.02  0.52 - 1.04 mg/dL Final     GFR Estimate 03/23/2017 56* >60 mL/min/1.7m2 Final    Non  GFR Calc     GFR Estimate If Black 03/23/2017 68  >60 mL/min/1.7m2 Final    African American GFR Calc     Calcium 03/23/2017 8.8  8.5 - 10.1 mg/dL Final     Bilirubin Total 03/23/2017 0.3  0.2 - 1.3 mg/dL Final     Albumin 03/23/2017 4.0  3.4 - 5.0 g/dL Final     Protein Total 03/23/2017 6.9  6.8 - 8.8 g/dL Final     Alkaline Phosphatase 03/23/2017 81  40 - 150 U/L Final     ALT 03/23/2017 16  0 - 50 U/L Final     AST 03/23/2017 17  0 - 45 U/L Final     IFC Specimen 03/23/2017 Blood   Final     CD3 Mature T 03/23/2017 69  49 - 84 % Final     CD4 [place] T 03/23/2017 47  28 - 63 % Final     CD8 Suppressor T 03/23/2017 19  10 - 40 % Final     CD19 B Cells 03/23/2017 18  6 - 27 % Final     CD16 + 56 Natural Killer Cells 03/23/2017 12  4 - 25 % Final     CD4:CD8 Ratio 03/23/2017 2.47  1.40 - 2.60 Final     Absolute CD3 03/23/2017 1040  603 - 2990 cells/uL Final     Absolute CD4 03/23/2017 709  441 - 2156 cells/uL Final     Absolute CD8 03/23/2017 282  125 - 1312 cells/uL Final     Absolute CD19 03/23/2017 264  107 - 698 cells/uL Final     Absolute CD16+56 03/23/2017 173  95 - 640 cells/uL Final     T Cell Subset Profile Interpretati* 03/23/2017 << Do Not Report >>   Final     IGG 03/23/2017 441* 695 - 1620 mg/dL Final     IGA 03/23/2017 32* 70 - 380 mg/dL Final     IGM 03/23/2017 34* 60 - 265 mg/dL Final     Diphtheria Antibody  "03/23/2017 0.41  IU/mL Final    Comment: A concentration >0.01 IU/mL is considered to be protective but a concentration  of >0.1 IU/mL is desirable.     Tetanus Antibody 03/23/2017 4.52  IU/mL Final    Comment: A concentration >0.01 IU/mL is considered to be protective but a concentration of >0.15 IU/mL is desirable.     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  Our recent labs reveal normal B and T cell subsets, and a normal antibody response to Diphtheria/Tetanus and sustained low immunoglobulin levels across the board.  She has anemia, and a normal albumin.      We remained concerned given her immunoglobulins are quite low, and she does have a history of some GI, skin/soft tissue, and sinopulmonary infections.  Some of this may be explained by her obesity and smoking history, however it is significant enough to warrant further investigation.  There is potential for common variable immunodeficiency (CVID).    The patient describes getting a \"pneumonia shot\" at age 28.  She recalls that this was because she had frequent bronchitis, and was starting work at a .  The immunization was \"recommended but not required,\" and she remembers deciding to take the shot.   Since she may show us protective antibody levels, we will start by just measuring Strep pneumoniae antibody titers, and depending on result with consider administering the vaccines for a pre and post assessment.    We will have the patient return in a month to discuss results and next steps.  We may have her come in for a nurse visit in the interim for vaccine.      Patient was seen and discussed with the Infectious Disease attending and clinic preceptor, Dr. Azeb Brown.      Dilan Fulton MD    Fellow in Adult and Pediatric Infectious Disease    pager: [phone]    Attending Attestation:  I evaluated Ms. " Ramesh with fellow Dr. Fulton.  I have reviewed today's labs and vitals as well as outside records. This note reflects our joint findings, assessment and recommendations.    Azeb Brown MD  [phone]        Again, thank you for allowing me to participate in the care of your patient.      Sincerely,    Dilan Fulton MD

## 2017-04-11 LAB
DEPRECATED S PNEUM 1 IGG SER-MCNC: 1 UG/ML
DEPRECATED S PNEUM12 IGG SER-MCNC: 0.2 UG/ML
DEPRECATED S PNEUM14 IGG SER-MCNC: 0.6 UG/ML
DEPRECATED S PNEUM17 IGG SER-MCNC: 0.7 UG/ML
DEPRECATED S PNEUM19 IGG SER-MCNC: 5.4 UG/ML
DEPRECATED S PNEUM2 IGG SER-MCNC: 0.1 UG/ML
DEPRECATED S PNEUM20 IGG SER-MCNC: 0.2 UG/ML
DEPRECATED S PNEUM22 IGG SER-MCNC: 4.3 UG/ML
DEPRECATED S PNEUM23 IGG SER-MCNC: 2.1 UG/ML
DEPRECATED S PNEUM3 IGG SER-MCNC: 0.4 UG/ML
DEPRECATED S PNEUM34 IGG SER-MCNC: 1.4 UG/ML
DEPRECATED S PNEUM4 IGG SER-MCNC: NORMAL UG/ML
DEPRECATED S PNEUM43 IGG SER-MCNC: 0.1 UG/ML
DEPRECATED S PNEUM5 IGG SER-MCNC: NORMAL UG/ML
DEPRECATED S PNEUM8 IGG SER-MCNC: 0.4 UG/ML
DEPRECATED S PNEUM9 IGG SER-MCNC: 0.4 UG/ML
S PNEUM DA 15B IGG SER-MCNC: 0.6 UG/ML
S PNEUM DA 18C IGG SER-MCNC: 0.2
S PNEUM DA 19A IGG SER-MCNC: NORMAL UG/ML
S PNEUM DA 33F IGG SER-MCNC: 0.5 UG/ML
S PNEUM DA 6B IGG SER-MCNC: 0.4 UG/ML
S PNEUM DA 7F IGG SER-MCNC: 1.4 UG/ML
S PNEUM DA 9V IGG SER-MCNC: 1.6 UG/ML

## 2017-04-29 ASSESSMENT — ENCOUNTER SYMPTOMS
FATIGUE: 1
INCREASED ENERGY: 0
LEG PAIN: 0
SINUS CONGESTION: 0
RECTAL BLEEDING: 0
BACK PAIN: 1
POLYPHAGIA: 0
CHILLS: 1
NIGHT SWEATS: 1
MUSCLE CRAMPS: 1
BLOOD IN STOOL: 0
SINUS PAIN: 1
MYALGIAS: 1
HEARTBURN: 0
LIGHT-HEADEDNESS: 1
DIZZINESS: 1
STIFFNESS: 0
ABDOMINAL PAIN: 1
JAUNDICE: 0
WEIGHT GAIN: 0
TINGLING: 1
POLYDIPSIA: 1
NUMBNESS: 1
CLAUDICATION: 0
WEIGHT LOSS: 1
SPEECH CHANGE: 0
DEPRESSION: 1
EXERCISE INTOLERANCE: 0
BOWEL INCONTINENCE: 0
TASTE DISTURBANCE: 0
DIARRHEA: 1
PANIC: 0
MEMORY LOSS: 0
LEG SWELLING: 0
NAUSEA: 1
HALLUCINATIONS: 0
MUSCLE WEAKNESS: 1
SYNCOPE: 0
RECTAL PAIN: 0
HEADACHES: 1
HYPERTENSION: 0
PALPITATIONS: 0
JOINT SWELLING: 0
SEIZURES: 0
DISTURBANCES IN COORDINATION: 0
PARALYSIS: 0
HOARSE VOICE: 0
TACHYCARDIA: 0
VOMITING: 1
ARTHRALGIAS: 1
FEVER: 0
TREMORS: 0
INSOMNIA: 0
DECREASED APPETITE: 1
CONSTIPATION: 1
NECK PAIN: 1
WEAKNESS: 1
HYPOTENSION: 0
DECREASED CONCENTRATION: 1
ALTERED TEMPERATURE REGULATION: 1
TROUBLE SWALLOWING: 0
SORE THROAT: 0
LOSS OF CONSCIOUSNESS: 0
NERVOUS/ANXIOUS: 0
BLOATING: 1
NECK MASS: 0
SLEEP DISTURBANCES DUE TO BREATHING: 0
SMELL DISTURBANCE: 1
ORTHOPNEA: 0

## 2017-05-04 ENCOUNTER — OFFICE VISIT (OUTPATIENT)
Dept: INFECTIOUS DISEASES | Facility: CLINIC | Age: 57
End: 2017-05-04
Attending: INTERNAL MEDICINE
Payer: COMMERCIAL

## 2017-05-04 VITALS
SYSTOLIC BLOOD PRESSURE: 108 MMHG | BODY MASS INDEX: 32.59 KG/M2 | TEMPERATURE: 98.2 F | HEART RATE: 91 BPM | WEIGHT: 195.6 LBS | HEIGHT: 65 IN | DIASTOLIC BLOOD PRESSURE: 67 MMHG

## 2017-05-04 DIAGNOSIS — Z23 NEED FOR PNEUMOCOCCAL VACCINATION: Primary | ICD-10-CM

## 2017-05-04 PROCEDURE — 90670 PCV13 VACCINE IM: CPT | Mod: ZF | Performed by: INTERNAL MEDICINE

## 2017-05-04 PROCEDURE — 99212 OFFICE O/P EST SF 10 MIN: CPT | Mod: 25,ZF

## 2017-05-04 PROCEDURE — 25000128 H RX IP 250 OP 636: Mod: ZF | Performed by: INTERNAL MEDICINE

## 2017-05-04 PROCEDURE — G0009 ADMIN PNEUMOCOCCAL VACCINE: HCPCS | Mod: ZF

## 2017-05-04 RX ADMIN — PNEUMOCOCCAL 13-VALENT CONJUGATE VACCINE 0.5 ML: 2.2; 2.2; 2.2; 2.2; 2.2; 4.4; 2.2; 2.2; 2.2; 2.2; 2.2; 2.2; 2.2 INJECTION, SUSPENSION INTRAMUSCULAR at 15:06

## 2017-05-04 ASSESSMENT — PAIN SCALES - GENERAL: PAINLEVEL: NO PAIN (0)

## 2017-05-04 NOTE — LETTER
"5/4/2017       RE: Nery Chandler  [address] [place] [id number]     Dear Colleague,    Thank you for referring your patient, Nery Chandler, to the [facility] AND INFECTIOUS DISEASES at [facility]. Please see a copy of my visit note below.          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 She presents to our clinic today for follow-up of low immunoglobulin levels that were identified by GI during their work-up.  Her history of prior infections revealed some GI, skin/soft tissue, and sinopulmonary infections.    For our initial visit 3/23/17, we ordered CBC, CMP, repeat immunoglobulins, B and T cell subsets, and a Diphtheria/Tetanus antibody titer.  This revealed anemia, normal CMP including normal albumin of 4.0, low immunoglobulins consistent with prior assessments, normal B and T cell subsets, and protective Diphtheria/Tetanus antibody titers.    For our next visit 4/6/17, history taking revealed that she had received a \"pneumonia shot\" at age 28.  We decided to check Strep pneumoniae antibodies before administering additional vaccination to check her response to the prior vaccine.    In the interim, Nery reports she is experiencing a lot of abdominal pain.  She has not had much of an appetite lately.  The initial improvements in her GI symptoms she was while on Miralax, but now that she is using more of a high fiber diet with Citrucell, she has not had as much relief.  Her stool consistency is softer, but she still has a feeling of incomplete evacuation.  She has not had any diarrhea, which is a nice relief for her. She has her next GI " "appointment in just 5 days - on 5/9/17.    The pain in her neck was treated with the Medrol dose pack, and then a \"steroid shot\" which helped the most.  Nery describes that it is a \"bone spur pushing on the nerve end and there is also disc protrusion.\"  She is not feeling any neck pain today.    Nery has not had any fevers, and is not aware of any new infections since our last visit.  She does have some curious symptoms, however.  She has experienced night sweats at the same time as feeling nauseous, usually every other night.  She has random chills but not rigors, and feels cold enough to want a space heater at work even when it has been warmer outside recently.   She has a constant \"runny nose\" and in the last month she has begun to smell something musty.  Her nose also burns.  She does think that she has environmental allergies, and so she takes Benadryl every 4-6 hours on bad days.  If she does not take the Benadryl, she notes that she can get \"sinus headaches\" or have coughing.        PAST MEDICAL HISTORY:    Depression  Hypothyroidism  Migraine headaches  Obesity  Heart murmur NOS    PAST SURGICAL HISTORY:  Nadya-en-Y gastric bypass 2004  C-sections x 2  Hysterectomy  Abdominal wall hernia repairs  Cholecystectomy    FAMILY PAST MEDICAL HISTORY:  No known family history of immunodeficiency     SOCIAL HISTORY:  Has smoked tobacco cigarettes since age 15  Presents in clinic with her , who is very supportive.  They live together and have adult children.    CURRENT MEDICATIONS:    Current Outpatient Prescriptions   Medication     LEVOTHYROXINE SODIUM PO     LISINOPRIL PO     QUEtiapine Fumarate (SEROQUEL PO)     BuPROPion HCl (WELLBUTRIN XL PO)     ONDANSETRON PO     TRAZODONE HCL PO     DICYCLOMINE HCL PO     SERTRALINE HCL PO     PANTOPRAZOLE SODIUM PO     IBUPROFEN PO     fluticasone (FLONASE) 50 MCG/ACT spray     aspirin-acetaminophen-caffeine (EXCEDRIN MIGRAINE) 250-250-65 MG per tablet     " "DIPHENHYDRAMINE HCL PO     cyanocobalamin (VITAMIN B12) 1000 MCG/ML injection     SUMATRIPTAN SUCCINATE PO     albuterol (2.5 MG/3ML) 0.083% neb solution     EPINEPHrine 0.3 MG/0.3ML injection     UNABLE TO FIND     No current facility-administered medications for this visit.      ALLERGIES:     Reports a history of anaphylaxis that required emergent intervention.  No etiology was identified, but patient now carries an epi-pen.     Allergies   Allergen Reactions     Chlorpheniramine Unknown     Dexatrim Unknown     Phenylpropanolamine Unknown     Celexa [Citalopram] Palpitations     Erythromycin Palpitations     Hydrocodone Palpitations     REVIEW OF SYSTEMS: A full 12-point review of systems was obtained, pertinent positives and negatives as above.   Patient also filled out an extensive clinic survey of HPI and ROS that is available in EPIC.  We reviewed these responses, and did not find any new concerns.    PHYSICAL EXAMINATION:    VITAL SIGNS: /67  Pulse 91  Temp 98.2  F (36.8  C) (Oral)  Ht 1.638 m (5' 4.5\")  Wt 88.7 kg (195 lb 9.6 oz)  BMI 33.06 kg/m2    GENERAL:   No acute distress    HEENT: No icterus or injection. Nasal septum a bit red, but no obvious lesions. Oropharynx moist and clear without lesions or exudate.    NECK: Supple and less tender  LYMPH:  No cervical, axillary or inguinal lymphadenopathy  LUNGS: Clear to ausculation bilaterally without any increased work of breathing  HEART: Regular rate and rhythm and no murmur, gallop or rub    ABDOMEN: Normoactive bowel sounds, obese and soft, nontender, nondistended, no hepatosplenomegaly.    /GYN: deferred  EXTREMITIES: Warm and well perfused without clubbing, cyanosis, or edema  SKIN:  No rashes  NEURO:  Awake, alert, no focal neurologic deficits.  PSYCH: Affect normal. Speech fluent and appropriate.     LABORATORY DATA:    Reviewed labs from [facility]:  1/9/17:   Fecal fat - normal,   Fecal pancreatic elastase >500, " Cryptosporidium Ag and Giardia Ag negative, serum IgA 23, tissue Transglutaminase IgA <2    2/8/17:   IgE 2,  IgA 24,  IgG 398,  IgM 24    L[facility]:      Component Date Value Ref Range Status     WBC 03/23/2017 6.6  4.0 - 11.0 10e9/L Final     RBC Count 03/23/2017 4.58  3.8 - 5.2 10e12/L Final     Hemoglobin 03/23/2017 8.9* 11.7 - 15.7 g/dL Final     Hematocrit 03/23/2017 31.2* 35.0 - 47.0 % Final     MCV 03/23/2017 68* 78 - 100 fl Final     MCH 03/23/2017 19.4* 26.5 - 33.0 pg Final     MCHC 03/23/2017 28.5* 31.5 - 36.5 g/dL Final     RDW 03/23/2017 17.8* 10.0 - 15.0 % Final     Platelet Count 03/23/2017 338  150 - 450 10e9/L Final     Diff Method 03/23/2017 Auto  Final     % Neutrophils 03/23/2017 61.7  % Final     % Lymphocytes 03/23/2017 23.9  % Final     % Monocytes 03/23/2017 7.4  % Final     % Eosinophils 03/23/2017 6.2  % Final     % Basophils 03/23/2017 0.5  % Final     % Immature Granulocytes 03/23/2017 0.3  % Final     Nucleated RBCs 03/23/2017 0  0 /100 Final     Absolute Neutrophil 03/23/2017 4.1  1.6 - 8.3 10e9/L Final     Absolute Lymphocytes 03/23/2017 1.6  0.8 - 5.3 10e9/L Final     Absolute Monocytes 03/23/2017 0.5  0.0 - 1.3 10e9/L Final     Absolute Eosinophils 03/23/2017 0.4  0.0 - 0.7 10e9/L Final     Absolute Basophils 03/23/2017 0.0  0.0 - 0.2 10e9/L Final     Abs Immature Granulocytes 03/23/2017 0.0  0 - 0.4 10e9/L Final     Absolute Nucleated RBC 03/23/2017 0.0   Final     Sodium 03/23/2017 142  133 - 144 mmol/L Final     Potassium 03/23/2017 4.1  3.4 - 5.3 mmol/L Final     Chloride 03/23/2017 107  94 - 109 mmol/L Final     Carbon Dioxide 03/23/2017 26  20 - 32 mmol/L Final     Anion Gap 03/23/2017 9  3 - 14 mmol/L Final     Glucose 03/23/2017 84  70 - 99 mg/dL Final     Urea Nitrogen 03/23/2017 18  7 - 30 mg/dL Final     Creatinine 03/23/2017 1.02  0.52 - 1.04 mg/dL Final     GFR Estimate 03/23/2017 56* >60 mL/min/1.7m2 Final    Non  GFR Calc     GFR  Estimate If Black 03/23/2017 68  >60 mL/min/1.7m2 Final    African American GFR Calc     Calcium 03/23/2017 8.8  8.5 - 10.1 mg/dL Final     Bilirubin Total 03/23/2017 0.3  0.2 - 1.3 mg/dL Final     Albumin 03/23/2017 4.0  3.4 - 5.0 g/dL Final     Protein Total 03/23/2017 6.9  6.8 - 8.8 g/dL Final     Alkaline Phosphatase 03/23/2017 81  40 - 150 U/L Final     ALT 03/23/2017 16  0 - 50 U/L Final     AST 03/23/2017 17  0 - 45 U/L Final     IFC Specimen 03/23/2017 Blood   Final                                     CD3 Mature T 03/23/2017 69  49 - 84 % Final     CD4 [place] T 03/23/2017 47  28 - 63 % Final     CD8 Suppressor T 03/23/2017 19  10 - 40 % Final     CD19 B Cells 03/23/2017 18  6 - 27 % Final     CD16 + 56 Natural Killer Cells 03/23/2017 12  4 - 25 % Final     CD4:CD8 Ratio 03/23/2017 2.47  1.40 - 2.60 Final     Absolute CD3 03/23/2017 1040  603 - 2990 cells/uL Final     Absolute CD4 03/23/2017 709  441 - 2156 cells/uL Final     Absolute CD8 03/23/2017 282  125 - 1312 cells/uL Final     Absolute CD19 03/23/2017 264  107 - 698 cells/uL Final     Absolute CD16+56 03/23/2017 173  95 - 640 cells/uL Final                                             IGG 03/23/2017 441* 695 - 1620 mg/dL Final     IGA 03/23/2017 32* 70 - 380 mg/dL Final     IGM 03/23/2017 34* 60 - 265 mg/dL Final     IGE 03/23/2017 4  0 - 114 KIU/L Final     Diphtheria Antibody 03/23/2017 0.41  IU/mL Final   Comment: A concentration >0.01 IU/mL is considered to be protective but a concentration   of >0.1 IU/mL is desirable.     Tetanus Antibody 03/23/2017 4.52  IU/mL Final   Comment: A concentration >0.01 IU/mL is considered to be protective but a concentration   of >0.15 IU/mL is desirable.     Orders Only on 04/06/2017   Component Date Value Ref Range Status     Serotype 1(1) 04/06/2017 1.0   Final     Serotype 2(2) 04/06/2017 0.1   Final     Serotype 3(3) 04/06/2017 0.4   Final     Serotype 4(4) 04/06/2017  <0.1  Final     Serotype 5(5) 04/06/2017   "<0.3  Final     Serotype 8(8) 04/06/2017 0.4   Final     Serotype 9N(9) 04/06/2017 0.4   Final     Serotype 12F(12) 04/06/2017 0.2   Final     Serotype 14(14) 04/06/2017 0.6   Final     Serotype 17F(17) 04/06/2017 0.7   Final     Serotype 19F(19) 04/06/2017 5.4   Final     Serotype 20(20) 04/06/2017 0.2   Final     Serotype 22F(22) 04/06/2017 4.3   Final     Serotype 23F(23) 04/06/2017 2.1   Final     Serotype 6B(26) 04/06/2017 0.4   Final     Serotype 10A(34) 04/06/2017 1.4   Final     Serotype 11A(43) 04/06/2017 0.1   Final     Serotype 7F(51) 04/06/2017 1.4   Final     Serotype 15B(54) 04/06/2017 0.6   Final     Serotype 18C(56) 04/06/2017 0.2   Final     Serotype 19A(57) 04/06/2017  <0.3  Final     Serotype 9V(68) 04/06/2017 1.6   Final     Serotype 33F(70) 04/06/2017 0.5   Final       ASSESSMENT AND PLAN:     Nery Chandler is a 57 y/o female with 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  Our recent labs reveal normal B and T cell subsets, and a normal antibody response to Diphtheria/Tetanus and sustained low immunoglobulin levels across the board.  She has anemia, and a normal albumin.   She did not retain much of an antibody response to Pneumococcal vaccination, but this was some 28 years ago.    We remained concerned given her immunoglobulins are quite low, and she does have a history of some GI, skin/soft tissue, and sinopulmonary infections.  Some of this may be explained by her obesity and smoking history, however it is significant enough to warrant further investigation.  There is potential for common variable immunodeficiency (CVID).    Today she describes some curious symptoms including night sweats with nausea every other night, constant runny nose that can burn and a new \"musty\" smell.  We are not sure what to make of this, in the absence of fevers or other changes.   It may be that Nery suffers from allergic rhinitis and would benefit from better treatment for this.  We " recommend to start Zyrtec (available over the counter), and to let us know if these symptoms persisted.   A change in smell makes us worried about a central problem in the brain.  If she does not improve with treatment for allergic rhinitis, we may need to consider imaging of the brain and the sinuses.    - Today we will offer the Prevnar 13  (protein conjugate vaccine).    - In 8 weeks, Nery will return for a nurse visit for Pneumovax 23 (polysaccharide vaccine).    - Within 4-6 weeks after the Pneumovax, we can repeat the Strep pneumoniae titers to check for immune response.    In the meantime, we will discuss her work-up with additional colleagues in Immunology to see if there are further evaluations we can do while performing the vaccine challenge above.  Follow-up to be determined.    Patient was seen and discussed with the Infectious Disease attending and clinic preceptor, Dr. Azeb Brown.      Dilan Fulton MD    Fellow in Adult and Pediatric Infectious Disease    pager: [phone]    Attending Attestation:  I evaluated Ms. Chandler with fellow Dr. Fulton.  I have reviewed pertinent labs, vitals and imaging results. This note reflects our joint findings, assessment and recommendations.    Azeb Brown MD  [phone]

## 2017-05-04 NOTE — PROGRESS NOTES
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 She presents to our clinic today for follow-up of low immunoglobulin levels that were identified by GI during their work-up.  Her history of prior infections revealed some GI, skin/soft tissue, and sinopulmonary infections.    For our initial visit 3/23/17, we ordered CBC, CMP, repeat immunoglobulins, B and T cell subsets, and a Diphtheria/Tetanus antibody titer.  This revealed anemia, normal CMP including normal albumin of 4.0, low immunoglobulins consistent with prior assessments, normal B and T cell subsets, and protective Diphtheria/Tetanus antibody titers.    For our next visit 4/6/17, history taking revealed that she had received a \"pneumonia shot\" at age 28.  We decided to check Strep pneumoniae antibodies before administering additional vaccination to check her response to the prior vaccine.    In the interim, Nery reports she is experiencing a lot of abdominal pain.  She has not had much of an appetite lately.  The initial improvements in her GI symptoms she was while on Miralax, but now that she is using more of a high fiber diet with Citrucell, she has not had as much relief.  Her stool consistency is softer, but she still has a feeling of incomplete evacuation.  She has not had any diarrhea, which is a nice relief for her. She has her next GI appointment in just 5 days - on 5/9/17.    The pain in her neck was treated with the Medrol dose pack, and then a \"steroid shot\" which helped the most.  Nery describes that it is a \"bone spur pushing on the nerve end and there is also disc protrusion.\"  She is not feeling any neck pain today.    Nery has " "not had any fevers, and is not aware of any new infections since our last visit.  She does have some curious symptoms, however.  She has experienced night sweats at the same time as feeling nauseous, usually every other night.  She has random chills but not rigors, and feels cold enough to want a space heater at work even when it has been warmer outside recently.   She has a constant \"runny nose\" and in the last month she has begun to smell something musty.  Her nose also burns.  She does think that she has environmental allergies, and so she takes Benadryl every 4-6 hours on bad days.  If she does not take the Benadryl, she notes that she can get \"sinus headaches\" or have coughing.        PAST MEDICAL HISTORY:    Depression  Hypothyroidism  Migraine headaches  Obesity  Heart murmur NOS    PAST SURGICAL HISTORY:  Nadya-en-Y gastric bypass 2004  C-sections x 2  Hysterectomy  Abdominal wall hernia repairs  Cholecystectomy    FAMILY PAST MEDICAL HISTORY:  No known family history of immunodeficiency     SOCIAL HISTORY:  Has smoked tobacco cigarettes since age 15  Presents in clinic with her , who is very supportive.  They live together and have adult children.    CURRENT MEDICATIONS:    Current Outpatient Prescriptions   Medication     LEVOTHYROXINE SODIUM PO     LISINOPRIL PO     QUEtiapine Fumarate (SEROQUEL PO)     BuPROPion HCl (WELLBUTRIN XL PO)     ONDANSETRON PO     TRAZODONE HCL PO     DICYCLOMINE HCL PO     SERTRALINE HCL PO     PANTOPRAZOLE SODIUM PO     IBUPROFEN PO     fluticasone (FLONASE) 50 MCG/ACT spray     aspirin-acetaminophen-caffeine (EXCEDRIN MIGRAINE) 250-250-65 MG per tablet     DIPHENHYDRAMINE HCL PO     cyanocobalamin (VITAMIN B12) 1000 MCG/ML injection     SUMATRIPTAN SUCCINATE PO     albuterol (2.5 MG/3ML) 0.083% neb solution     EPINEPHrine 0.3 MG/0.3ML injection     UNABLE TO FIND     No current facility-administered medications for this visit.      ALLERGIES:     Reports a history of " "anaphylaxis that required emergent intervention.  No etiology was identified, but patient now carries an epi-pen.     Allergies   Allergen Reactions     Chlorpheniramine Unknown     Dexatrim Unknown     Phenylpropanolamine Unknown     Celexa [Citalopram] Palpitations     Erythromycin Palpitations     Hydrocodone Palpitations     REVIEW OF SYSTEMS: A full 12-point review of systems was obtained, pertinent positives and negatives as above.   Patient also filled out an extensive clinic survey of HPI and ROS that is available in EPIC.  We reviewed these responses, and did not find any new concerns.    PHYSICAL EXAMINATION:    VITAL SIGNS: /67  Pulse 91  Temp 98.2  F (36.8  C) (Oral)  Ht 1.638 m (5' 4.5\")  Wt 88.7 kg (195 lb 9.6 oz)  BMI 33.06 kg/m2    GENERAL:   No acute distress    HEENT: No icterus or injection. Nasal septum a bit red, but no obvious lesions. Oropharynx moist and clear without lesions or exudate.    NECK: Supple and less tender  LYMPH:  No cervical, axillary or inguinal lymphadenopathy  LUNGS: Clear to ausculation bilaterally without any increased work of breathing  HEART: Regular rate and rhythm and no murmur, gallop or rub    ABDOMEN: Normoactive bowel sounds, obese and soft, nontender, nondistended, no hepatosplenomegaly.    /GYN: deferred  EXTREMITIES: Warm and well perfused without clubbing, cyanosis, or edema  SKIN:  No rashes  NEURO:  Awake, alert, no focal neurologic deficits.  PSYCH: Affect normal. Speech fluent and appropriate.     LABORATORY DATA:    Reviewed labs from [facility]:  1/9/17:   Fecal fat - normal,   Fecal pancreatic elastase >500, Cryptosporidium Ag and Giardia Ag negative, serum IgA 23, tissue Transglutaminase IgA <2    2/8/17:   IgE 2,  IgA 24,  IgG 398,  IgM 24    Labs here at [facility]:      Component Date Value Ref Range Status     WBC 03/23/2017 6.6  4.0 - 11.0 10e9/L Final     RBC Count 03/23/2017 4.58  3.8 - 5.2 10e12/L Final     " Hemoglobin 03/23/2017 8.9* 11.7 - 15.7 g/dL Final     Hematocrit 03/23/2017 31.2* 35.0 - 47.0 % Final     MCV 03/23/2017 68* 78 - 100 fl Final     MCH 03/23/2017 19.4* 26.5 - 33.0 pg Final     MCHC 03/23/2017 28.5* 31.5 - 36.5 g/dL Final     RDW 03/23/2017 17.8* 10.0 - 15.0 % Final     Platelet Count 03/23/2017 338  150 - 450 10e9/L Final     Diff Method 03/23/2017 Auto  Final     % Neutrophils 03/23/2017 61.7  % Final     % Lymphocytes 03/23/2017 23.9  % Final     % Monocytes 03/23/2017 7.4  % Final     % Eosinophils 03/23/2017 6.2  % Final     % Basophils 03/23/2017 0.5  % Final     % Immature Granulocytes 03/23/2017 0.3  % Final     Nucleated RBCs 03/23/2017 0  0 /100 Final     Absolute Neutrophil 03/23/2017 4.1  1.6 - 8.3 10e9/L Final     Absolute Lymphocytes 03/23/2017 1.6  0.8 - 5.3 10e9/L Final     Absolute Monocytes 03/23/2017 0.5  0.0 - 1.3 10e9/L Final     Absolute Eosinophils 03/23/2017 0.4  0.0 - 0.7 10e9/L Final     Absolute Basophils 03/23/2017 0.0  0.0 - 0.2 10e9/L Final     Abs Immature Granulocytes 03/23/2017 0.0  0 - 0.4 10e9/L Final     Absolute Nucleated RBC 03/23/2017 0.0   Final     Sodium 03/23/2017 142  133 - 144 mmol/L Final     Potassium 03/23/2017 4.1  3.4 - 5.3 mmol/L Final     Chloride 03/23/2017 107  94 - 109 mmol/L Final     Carbon Dioxide 03/23/2017 26  20 - 32 mmol/L Final     Anion Gap 03/23/2017 9  3 - 14 mmol/L Final     Glucose 03/23/2017 84  70 - 99 mg/dL Final     Urea Nitrogen 03/23/2017 18  7 - 30 mg/dL Final     Creatinine 03/23/2017 1.02  0.52 - 1.04 mg/dL Final     GFR Estimate 03/23/2017 56* >60 mL/min/1.7m2 Final    Non  GFR Calc     GFR Estimate If Black 03/23/2017 68  >60 mL/min/1.7m2 Final    African American GFR Calc     Calcium 03/23/2017 8.8  8.5 - 10.1 mg/dL Final     Bilirubin Total 03/23/2017 0.3  0.2 - 1.3 mg/dL Final     Albumin 03/23/2017 4.0  3.4 - 5.0 g/dL Final     Protein Total 03/23/2017 6.9  6.8 - 8.8 g/dL Final     Alkaline Phosphatase  03/23/2017 81  40 - 150 U/L Final     ALT 03/23/2017 16  0 - 50 U/L Final     AST 03/23/2017 17  0 - 45 U/L Final     IFC Specimen 03/23/2017 Blood   Final                                     CD3 Mature T 03/23/2017 69  49 - 84 % Final     CD4 [place] T 03/23/2017 47  28 - 63 % Final     CD8 Suppressor T 03/23/2017 19  10 - 40 % Final     CD19 B Cells 03/23/2017 18  6 - 27 % Final     CD16 + 56 Natural Killer Cells 03/23/2017 12  4 - 25 % Final     CD4:CD8 Ratio 03/23/2017 2.47  1.40 - 2.60 Final     Absolute CD3 03/23/2017 1040  603 - 2990 cells/uL Final     Absolute CD4 03/23/2017 709  441 - 2156 cells/uL Final     Absolute CD8 03/23/2017 282  125 - 1312 cells/uL Final     Absolute CD19 03/23/2017 264  107 - 698 cells/uL Final     Absolute CD16+56 03/23/2017 173  95 - 640 cells/uL Final                                             IGG 03/23/2017 441* 695 - 1620 mg/dL Final     IGA 03/23/2017 32* 70 - 380 mg/dL Final     IGM 03/23/2017 34* 60 - 265 mg/dL Final     IGE 03/23/2017 4  0 - 114 KIU/L Final     Diphtheria Antibody 03/23/2017 0.41  IU/mL Final   Comment: A concentration >0.01 IU/mL is considered to be protective but a concentration   of >0.1 IU/mL is desirable.     Tetanus Antibody 03/23/2017 4.52  IU/mL Final   Comment: A concentration >0.01 IU/mL is considered to be protective but a concentration   of >0.15 IU/mL is desirable.     Orders Only on 04/06/2017   Component Date Value Ref Range Status     Serotype 1(1) 04/06/2017 1.0   Final     Serotype 2(2) 04/06/2017 0.1   Final     Serotype 3(3) 04/06/2017 0.4   Final     Serotype 4(4) 04/06/2017  <0.1  Final     Serotype 5(5) 04/06/2017  <0.3  Final     Serotype 8(8) 04/06/2017 0.4   Final     Serotype 9N(9) 04/06/2017 0.4   Final     Serotype 12F(12) 04/06/2017 0.2   Final     Serotype 14(14) 04/06/2017 0.6   Final     Serotype 17F(17) 04/06/2017 0.7   Final     Serotype 19F(19) 04/06/2017 5.4   Final     Serotype 20(20) 04/06/2017 0.2   Final      "Serotype 22F(22) 04/06/2017 4.3   Final     Serotype 23F(23) 04/06/2017 2.1   Final     Serotype 6B(26) 04/06/2017 0.4   Final     Serotype 10A(34) 04/06/2017 1.4   Final     Serotype 11A(43) 04/06/2017 0.1   Final     Serotype 7F(51) 04/06/2017 1.4   Final     Serotype 15B(54) 04/06/2017 0.6   Final     Serotype 18C(56) 04/06/2017 0.2   Final     Serotype 19A(57) 04/06/2017  <0.3  Final     Serotype 9V(68) 04/06/2017 1.6   Final     Serotype 33F(70) 04/06/2017 0.5   Final       ASSESSMENT AND PLAN:     Nery Chandler is a 55 y/o female with 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  Our recent labs reveal normal B and T cell subsets, and a normal antibody response to Diphtheria/Tetanus and sustained low immunoglobulin levels across the board.  She has anemia, and a normal albumin.   She did not retain much of an antibody response to Pneumococcal vaccination, but this was some 28 years ago.    We remained concerned given her immunoglobulins are quite low, and she does have a history of some GI, skin/soft tissue, and sinopulmonary infections.  Some of this may be explained by her obesity and smoking history, however it is significant enough to warrant further investigation.  There is potential for common variable immunodeficiency (CVID).    Today she describes some curious symptoms including night sweats with nausea every other night, constant runny nose that can burn and a new \"musty\" smell.  We are not sure what to make of this, in the absence of fevers or other changes.   It may be that Nery suffers from allergic rhinitis and would benefit from better treatment for this.  We recommend to start Zyrtec (available over the counter), and to let us know if these symptoms persisted.   A change in smell makes us worried about a central problem in the brain.  If she does not improve with treatment for allergic rhinitis, we may need to consider imaging of the brain and the sinuses.    - Today we will " offer the Prevnar 13  (protein conjugate vaccine).    - In 8 weeks, Nery will return for a nurse visit for Pneumovax 23 (polysaccharide vaccine).    - Within 4-6 weeks after the Pneumovax, we can repeat the Strep pneumoniae titers to check for immune response.    In the meantime, we will discuss her work-up with additional colleagues in Immunology to see if there are further evaluations we can do while performing the vaccine challenge above.  Follow-up to be determined.    Patient was seen and discussed with the Infectious Disease attending and clinic preceptor, Dr. Azeb Brown.      Dilan Fulton MD    Fellow in Adult and Pediatric Infectious Disease    pager: [phone]    Attending Attestation:  I evaluated Ms. Chandler with fellow Dr. Fulton.  I have reviewed pertinent labs, vitals and imaging results. This note reflects our joint findings, assessment and recommendations.    Azeb Brown MD  [phone]

## 2017-05-04 NOTE — MR AVS SNAPSHOT
After Visit Summary   5/4/2017    Nery Chandler    MRN: [id number]           Patient Information     Date Of Birth          1960        Visit Information        Provider Department      5/4/2017 2:00 PM Dilan Fulton MD [facility] and Infectious Diseases        Today's Diagnoses     Need for pneumococcal vaccination    -  1      Care Instructions      Thank you for coming in today regarding follow up of your low immunoglobulins.    We will give you the Prevnar 13 vaccine today, and plan for the Pneumovax 23 in 8 weeks.    You can make a nurse visit for that.    Also, please try and take the Zyrtec = cetirizine one pill every day, and see if that helps your symptoms. If the musty smell persists, let me know.    Call me with any concerns.  We will determine follow-up as needed by phone.    Check your temperature by mouth if you think you might have a fever.  If you have a temperature > 100.4 degF, please call me to discuss your symptoms.    Dilan Fulton MD  Fellow, Infectious Disease    Work number [phone], Call/Text during work hours Mon-Fri        Follow-ups after your visit        Your next 10 appointments already scheduled     Jun 29, 2017  1:00 PM CDT   Nurse Visit with  Dsc Nurse   [facility] and Infectious Diseases ([facility])    [address] [place] [address]  3rd Floor  [facility] [address] [phone]              Who to contact     If you have questions or need follow up information about today's clinic visit or your schedule please contact [facility] AND INFECTIOUS DISEASES directly at [phone].  Normal or non-critical lab and imaging results will be communicated to you by MyChart, letter or phone within 4 business days after the clinic has received the results. If you do not hear from us within 7 days, please contact the clinic through MyChart or phone. If you have a critical or abnormal lab  "result, we will notify you by phone as soon as possible.  Submit refill requests through [employer] or call your pharmacy and they will forward the refill request to us. Please allow 3 business days for your refill to be completed.          Additional Information About Your Visit        [employer] Information     [employer] gives you secure access to your electronic health record. If you see a primary care provider, you can also send messages to your care team and make appointments. If you have questions, please call your primary care clinic.  If you do not have a primary care provider, please call [phone] and they will assist you.        Care EveryWhere ID     This is your Care EveryWhere ID. This could be used by other organizations to access your [place] medical records  [id number]        Your Vitals Were     Pulse Temperature Height BMI (Body Mass Index)          91 98.2  F (36.8  C) (Oral) 1.638 m (5' 4.5\") 33.06 kg/m2         Blood Pressure from Last 3 Encounters:   05/04/17 108/67   04/06/17 116/73   03/23/17 132/81    Weight from Last 3 Encounters:   05/04/17 88.7 kg (195 lb 9.6 oz)   04/06/17 90.4 kg (199 lb 4.8 oz)   03/23/17 90.4 kg (199 lb 4.8 oz)              Today, you had the following     No orders found for display       Primary Care Provider Office Phone # Fax #    Jose Astorga [phone]       [facility] [address] [facility] [address]        Thank you!     Thank you for choosing [facility] AND INFECTIOUS DISEASES  for your care. Our goal is always to provide you with excellent care. Hearing back from our patients is one way we can continue to improve our services. Please take a few minutes to complete the written survey that you may receive in the mail after your visit with us. Thank you!             Your Updated Medication List - Protect others around you: Learn how to safely use, store and throw away your medicines at www.disposemymeds.org.       "    This list is accurate as of: 5/4/17  3:00 PM.  Always use your most recent med list.                   Brand Name Dispense Instructions for use    albuterol (2.5 MG/3ML) 0.083% neb solution      Take 1 vial by nebulization every 6 hours as needed for shortness of breath / dyspnea or wheezing       aspirin-acetaminophen-caffeine 250-250-65 MG per tablet    EXCEDRIN MIGRAINE     Take 2 tablets by mouth every 6 hours as needed for headaches       cyanocobalamin 1000 MCG/ML injection    VITAMIN B12     Inject 1 mL into the muscle every 30 days Reported on 5/4/2017       DICYCLOMINE HCL PO      Take 20 mg by mouth 4 times daily       DIPHENHYDRAMINE HCL PO      Take 25 mg by mouth every 4 hours as needed       EPINEPHrine 0.3 MG/0.3ML injection      Inject 0.3 mg into the muscle as needed for anaphylaxis       IBUPROFEN PO      Take 200 mg by mouth 4 times daily       LEVOTHYROXINE SODIUM PO      Take 112 mcg by mouth daily       LISINOPRIL PO      Take 10 mg by mouth daily       ONDANSETRON PO      Take 4 mg by mouth every 8 hours as needed for nausea       PANTOPRAZOLE SODIUM PO      Take 40 mg by mouth daily       SEROQUEL PO      Take 50 mg by mouth At Bedtime       SERTRALINE HCL PO      Take 100 mg by mouth 2 times daily       SUMATRIPTAN SUCCINATE PO      Take 100 mg by mouth once       TRAZODONE HCL PO      Take 100 mg by mouth At Bedtime       UNABLE TO FIND      Take 2 tablets by mouth daily MEDICATION NAME: IBgard for IBS       WELLBUTRIN XL PO      Take 300 mg by mouth daily

## 2017-05-04 NOTE — NURSING NOTE
"Chief Complaint   Patient presents with     RECHECK     F/U immunoglobulins       Initial /67  Pulse 91  Temp 98.2  F (36.8  C) (Oral)  Ht 1.638 m (5' 4.5\")  Wt 88.7 kg (195 lb 9.6 oz)  BMI 33.06 kg/m2 Estimated body mass index is 33.06 kg/(m^2) as calculated from the following:    Height as of this encounter: 1.638 m (5' 4.5\").    Weight as of this encounter: 88.7 kg (195 lb 9.6 oz).  Medication Reconciliation: complete  "

## 2017-05-04 NOTE — PATIENT INSTRUCTIONS
Thank you for coming in today regarding follow up of your low immunoglobulins.    We will give you the Prevnar 13 vaccine today, and plan for the Pneumovax 23 in 8 weeks.    You can make a nurse visit for that.    Also, please try and take the Zyrtec = cetirizine one pill every day, and see if that helps your symptoms. If the musty smell persists, let me know.    Call me with any concerns.  We will determine follow-up as needed by phone.    Check your temperature by mouth if you think you might have a fever.  If you have a temperature > 100.4 degF, please call me to discuss your symptoms.    Dilan Fulton MD  Fellow, Infectious Disease    Work number [phone], Call/Text during work hours Mon-Fri

## 2017-06-27 DIAGNOSIS — Z00.00 HEALTHCARE MAINTENANCE: Primary | ICD-10-CM

## 2018-01-21 ENCOUNTER — HEALTH MAINTENANCE LETTER (OUTPATIENT)
Age: 58
End: 2018-01-21

## 2020-03-10 ENCOUNTER — HEALTH MAINTENANCE LETTER (OUTPATIENT)
Age: 60
End: 2020-03-10

## 2020-12-27 ENCOUNTER — HEALTH MAINTENANCE LETTER (OUTPATIENT)
Age: 60
End: 2020-12-27

## 2021-04-24 ENCOUNTER — HEALTH MAINTENANCE LETTER (OUTPATIENT)
Age: 61
End: 2021-04-24

## 2021-10-09 ENCOUNTER — HEALTH MAINTENANCE LETTER (OUTPATIENT)
Age: 61
End: 2021-10-09

## 2022-03-26 ENCOUNTER — HEALTH MAINTENANCE LETTER (OUTPATIENT)
Age: 62
End: 2022-03-26

## 2022-05-21 ENCOUNTER — HEALTH MAINTENANCE LETTER (OUTPATIENT)
Age: 62
End: 2022-05-21

## 2022-09-11 ENCOUNTER — HEALTH MAINTENANCE LETTER (OUTPATIENT)
Age: 62
End: 2022-09-11

## 2023-06-03 ENCOUNTER — HEALTH MAINTENANCE LETTER (OUTPATIENT)
Age: 63
End: 2023-06-03